# Patient Record
Sex: MALE | Race: WHITE | Employment: FULL TIME | URBAN - METROPOLITAN AREA
[De-identification: names, ages, dates, MRNs, and addresses within clinical notes are randomized per-mention and may not be internally consistent; named-entity substitution may affect disease eponyms.]

---

## 2019-12-31 ENCOUNTER — HOSPITAL ENCOUNTER (EMERGENCY)
Age: 19
Discharge: HOME | End: 2019-12-31
Payer: COMMERCIAL

## 2019-12-31 ENCOUNTER — APPOINTMENT (OUTPATIENT)
Dept: GENERAL RADIOLOGY | Age: 19
End: 2019-12-31
Payer: COMMERCIAL

## 2019-12-31 VITALS
OXYGEN SATURATION: 98 % | DIASTOLIC BLOOD PRESSURE: 75 MMHG | TEMPERATURE: 98.1 F | HEART RATE: 63 BPM | RESPIRATION RATE: 16 BRPM | SYSTOLIC BLOOD PRESSURE: 124 MMHG | WEIGHT: 170 LBS

## 2019-12-31 PROCEDURE — 73562 X-RAY EXAM OF KNEE 3: CPT

## 2019-12-31 PROCEDURE — 99212 OFFICE O/P EST SF 10 MIN: CPT

## 2019-12-31 ASSESSMENT — PAIN DESCRIPTION - ORIENTATION: ORIENTATION: LEFT

## 2019-12-31 ASSESSMENT — PAIN SCALES - GENERAL: PAINLEVEL_OUTOF10: 2

## 2019-12-31 ASSESSMENT — PAIN DESCRIPTION - PAIN TYPE: TYPE: ACUTE PAIN

## 2019-12-31 NOTE — LETTER
[facility]  [address]  [facility] [address]  Phone: [phone]               December 31, 2019    Patient: Erik Dakin   YOB: 2000   Date of Visit: 12/31/2019       To Whom It May Concern:    Luli Mckinnon was seen and treated in our emergency department on 12/31/2019. He may return to work on 1/2/2020.       Sincerely,       Lauri Sanchez LPN         Signature:__________________________________

## 2019-12-31 NOTE — ED PROVIDER NOTES
reviewed. /75   Pulse 63   Temp 98.1 °F (36.7 °C) (Oral)   Resp 16   Wt 170 lb (77.1 kg)   SpO2 98%   Oxygen Saturation Interpretation: Normal      ------------------------------------------ PROGRESS NOTES ------------------------------------------   I have spoken with the patient and discussed todays results, in addition to providing specific details for the plan of care and counseling regarding the diagnosis and prognosis. Their questions are answered at this time and they are agreeable with the plan.      --------------------------------- ADDITIONAL PROVIDER NOTES ---------------------------------     This patient is stable for discharge. I have shared the specific conditions for return, as well as the importance of follow-up. * NOTE: This report was transcribed using voice recognition software. Every effort was made to ensure accuracy; however, inadvertent computerized transcription errors may be present.    --------------------------------- IMPRESSION AND DISPOSITION ---------------------------------    IMPRESSION  1. Sprain of left knee, unspecified ligament, initial encounter    2.  Abrasion, knee, left, initial encounter        DISPOSITION  Disposition: Discharge to home  Patient condition is good         Gilda Baird PA-C  12/31/19 0593

## 2021-04-02 ENCOUNTER — HOSPITAL ENCOUNTER (EMERGENCY)
Age: 21
Discharge: HOME | End: 2021-04-02
Payer: COMMERCIAL

## 2021-04-02 VITALS
HEART RATE: 80 BPM | SYSTOLIC BLOOD PRESSURE: 123 MMHG | TEMPERATURE: 98.6 F | BODY MASS INDEX: 25.2 KG/M2 | HEIGHT: 71 IN | WEIGHT: 180 LBS | OXYGEN SATURATION: 97 % | RESPIRATION RATE: 20 BRPM | DIASTOLIC BLOOD PRESSURE: 78 MMHG

## 2021-04-02 DIAGNOSIS — L03.119 CELLULITIS OF LOWER EXTREMITY, UNSPECIFIED LATERALITY: Primary | ICD-10-CM

## 2021-04-02 DIAGNOSIS — L23.7 POISON IVY: ICD-10-CM

## 2021-04-02 PROCEDURE — 99211 OFF/OP EST MAY X REQ PHY/QHP: CPT

## 2021-04-02 RX ORDER — CEPHALEXIN 500 MG/1
500 CAPSULE ORAL 4 TIMES DAILY
Qty: 28 CAPSULE | Refills: 0 | Status: SHIPPED | OUTPATIENT
Start: 2021-04-02 | End: 2021-04-09

## 2021-04-02 RX ORDER — PREDNISONE 20 MG/1
40 TABLET ORAL DAILY
Qty: 10 TABLET | Refills: 0 | Status: SHIPPED | OUTPATIENT
Start: 2021-04-02 | End: 2021-04-07

## 2021-04-02 NOTE — ED PROVIDER NOTES
Department of Emergency Medicine   [facility]  Provider Note  Admit Date/RoomTime: 2021  7:13 PM  Room:     NAME: Eula Pineda  : 2000  MRN: [id number]     Chief Complaint:  Rash (started  monday with  rash feet leg wrist  mostly right leg)    History of Present Illness       Eula Pineda is a 23 y.o. old male with has a past medical history of: History reviewed. No pertinent past medical history. presents to the urgent care center by private vehicle, for complaint of a rash that he said is poison ivy on his arms and on his legs and feet on the back of his right leg he now has a large red warm swollen patch of skin he said it was itchy there but is not really itchy now but it is tender to touch. This started about 5 days ago. ROS    Pertinent positives and negatives are stated within HPI, all other systems reviewed and are negative. Past Surgical History:   Procedure Laterality Date    TONSILLECTOMY     Social History:  reports that he has never smoked. He has never used smokeless tobacco. He reports that he does not drink alcohol or use drugs. Family History: family history is not on file. Allergies: Patient has no known allergies. Physical Exam           ED Triage Vitals [21 1914]   BP Temp Temp Source Pulse Resp SpO2 Height Weight   123/78 98.6 °F (37 °C) Infrared 80 20 97 % 5' 11\" (1.803 m) 180 lb (81.6 kg)     Oxygen Saturation Interpretation: Normal.    Constitutional:  Alert, development consistent with age. HEENT:  NC/NT. Eyes:  Clear conjunctiva, no discharge. Mouth:  Mucous membranes moist without lesions, tongue and gums normal.    Neck:  Supple. Respiratory:  No respiratory distree  CV:  Regular rate and rhythm. GI:  Abdomen Soft, nontender, +BS. Integument:             There is an area of erythema on her right posterior thigh that is approximately 10 cm x 5 cm's it is warm to touch.   I do not see any rash there at present. He does have a scattered rash on his arms and legs. .  Neurological:  Orientation age-appropriate unless noted elseware. Motor functions intact. Lab / Imaging Results   (All laboratory and radiology results have been personally reviewed by myself)  Labs:  No results found for this visit on 04/02/21. Imaging: All Radiology results interpreted by Radiologist unless otherwise noted. No orders to display       ED Course / Medical Decision Making   Medications - No data to display       Did put him on some prednisone and also some Keflex for the area of cellulitis advised if this worsens or spreads he should get reevaluated otherwise follow-up with his doctor    Assessment      1. Cellulitis of lower extremity, unspecified laterality    2. Poison ivy      Plan   Discharge to home and advised to contact follow up with your Dr      As needed   Patient condition is good    New Medications     New Prescriptions    CEPHALEXIN (KEFLEX) 500 MG CAPSULE    Take 1 capsule by mouth 4 times daily for 7 days    PREDNISONE (DELTASONE) 20 MG TABLET    Take 2 tablets by mouth daily for 5 days     Electronically signed by JULIENNE Finnegan CNP   DD: 4/2/21  **This report was transcribed using voice recognition software. Every effort was made to ensure accuracy; however, inadvertent computerized transcription errors may be present.   END OF ED PROVIDER NOTE       JULIENNE Finnegan CNP  04/02/21 1934

## 2024-04-23 ENCOUNTER — HOSPITAL ENCOUNTER (EMERGENCY)
Age: 24
Discharge: HOME | End: 2024-04-23
Payer: COMMERCIAL

## 2024-04-23 ENCOUNTER — APPOINTMENT (OUTPATIENT)
Dept: GENERAL RADIOLOGY | Age: 24
End: 2024-04-23
Payer: COMMERCIAL

## 2024-04-23 VITALS
OXYGEN SATURATION: 97 % | WEIGHT: 215 LBS | DIASTOLIC BLOOD PRESSURE: 83 MMHG | TEMPERATURE: 98.2 F | RESPIRATION RATE: 18 BRPM | SYSTOLIC BLOOD PRESSURE: 134 MMHG | BODY MASS INDEX: 29.99 KG/M2 | HEART RATE: 70 BPM

## 2024-04-23 DIAGNOSIS — S93.402A SPRAIN OF LEFT ANKLE, UNSPECIFIED LIGAMENT, INITIAL ENCOUNTER: Primary | ICD-10-CM

## 2024-04-23 PROCEDURE — 73610 X-RAY EXAM OF ANKLE: CPT

## 2024-04-23 PROCEDURE — 99211 OFF/OP EST MAY X REQ PHY/QHP: CPT

## 2024-04-23 NOTE — ED PROVIDER NOTES
[facility]  EMERGENCY DEPARTMENT ENCOUNTER        NAME: Galindo Muniz  :  2000  MRN:  [id number]  Date of evaluation: 2024  Provider: Tushar Granados PA-C  PCP: No primary care provider on file.  Note Started : 7:22 PM EDT 24    Chief Complaint: Ankle Pain (Pt stated that he injured his left ankle on Friday. Sore to touch. )      This is a 23-year-old male that presents to urgent care complaining of left ankle pain and swelling x 4 days.  Patient denies any numbness or tingling or weakness.  On first contact patient he appears to be in no acute distress.  No knee or hip pain.        Review of Systems  Pertinent positives and negatives are stated within HPI, all other systems reviewed and are negative.     Allergies: Patient has no known allergies.     --------------------------------------------- PAST HISTORY ---------------------------------------------  Past Medical History:  has no past medical history on file.    Past Surgical History:  has a past surgical history that includes Tonsillectomy.    Social History:  reports that he has never smoked. He has never used smokeless tobacco. He reports that he does not drink alcohol and does not use drugs.    Family History: family history is not on file.     The patient’s home medications have been reviewed.    The nursing notes within the ED encounter have been reviewed.     ------------------------------------------------SCREENINGS----------------------------------------------                        CIWA Assessment  BP: 134/83  Pulse: 70           ---------------------------------------------PHYSICAL EXAM --------------------------------------------    Vitals:    24   BP:  134/83   Pulse:  70   Resp: 18 18   Temp: 98.2 °F (36.8 °C)    SpO2:  97%   Weight: 97.5 kg (215 lb)      Oxygen Saturation Interpretation: Normal     Physical Exam  Vitals and nursing note reviewed.   Constitutional:

## 2024-06-10 ENCOUNTER — HOSPITAL ENCOUNTER (EMERGENCY)
Age: 24
Discharge: HOME | End: 2024-06-10
Payer: COMMERCIAL

## 2024-06-10 ENCOUNTER — APPOINTMENT (OUTPATIENT)
Dept: GENERAL RADIOLOGY | Age: 24
End: 2024-06-10
Payer: COMMERCIAL

## 2024-06-10 VITALS
OXYGEN SATURATION: 96 % | TEMPERATURE: 98.2 F | HEART RATE: 68 BPM | WEIGHT: 197 LBS | HEIGHT: 70 IN | SYSTOLIC BLOOD PRESSURE: 117 MMHG | DIASTOLIC BLOOD PRESSURE: 70 MMHG | BODY MASS INDEX: 28.2 KG/M2 | RESPIRATION RATE: 18 BRPM

## 2024-06-10 DIAGNOSIS — S60.221A CONTUSION OF RIGHT HAND, INITIAL ENCOUNTER: Primary | ICD-10-CM

## 2024-06-10 PROCEDURE — 73130 X-RAY EXAM OF HAND: CPT

## 2024-06-10 PROCEDURE — 99211 OFF/OP EST MAY X REQ PHY/QHP: CPT

## 2024-06-10 RX ORDER — IBUPROFEN 800 MG/1
800 TABLET ORAL EVERY 8 HOURS PRN
Qty: 30 TABLET | Refills: 0 | Status: SHIPPED | OUTPATIENT
Start: 2024-06-10

## 2024-06-10 ASSESSMENT — PAIN DESCRIPTION - ORIENTATION: ORIENTATION: RIGHT

## 2024-06-10 ASSESSMENT — PAIN SCALES - GENERAL: PAINLEVEL_OUTOF10: 3

## 2024-06-10 ASSESSMENT — PAIN DESCRIPTION - LOCATION: LOCATION: HAND

## 2024-06-10 ASSESSMENT — PAIN - FUNCTIONAL ASSESSMENT: PAIN_FUNCTIONAL_ASSESSMENT: 0-10

## 2024-06-10 ASSESSMENT — PAIN DESCRIPTION - PAIN TYPE: TYPE: ACUTE PAIN

## 2024-06-10 ASSESSMENT — PAIN DESCRIPTION - DESCRIPTORS: DESCRIPTORS: ACHING

## 2024-06-10 ASSESSMENT — PAIN DESCRIPTION - FREQUENCY: FREQUENCY: CONTINUOUS

## 2024-06-10 NOTE — ED PROVIDER NOTES
requiring emergent reevaluation in the ER.     Plan of Care/Counseling:  JULIENNE Sandy CNP reviewed today's visit with the patient in addition to providing specific details for the plan of care and counseling regarding the diagnosis and prognosis.  Questions are answered at this time and are agreeable with the plan.    Assessment      1. Contusion of right hand, initial encounter      Plan   Discharged home.  Patient condition is stable    Andre Nogueira MD  [address] [facility] [address]  [phone]    Call   If symptoms worsen       New Medications     New Prescriptions    IBUPROFEN (ADVIL;MOTRIN) 800 MG TABLET    Take 1 tablet by mouth every 8 hours as needed for Pain     Electronically signed by JULIENNE Sandy CNP   DD: 6/10/24  **This report was transcribed using voice recognition software. Every effort was made to ensure accuracy; however, inadvertent computerized transcription errors may be present.  END OF ED PROVIDER NOTE      Ester Hogan APRN - CNP  06/10/24 1424

## 2024-06-10 NOTE — DISCHARGE INSTRUCTIONS
No fractures on your x-ray.  Continue to rest, ice, elevate your hand, Ace wrap while awake, may remove at rest and for showering.  Please follow-up with orthopedics if symptoms do not improve with conservative treatment over the next 10 to 14 days.

## 2024-12-15 ENCOUNTER — HOSPITAL ENCOUNTER (EMERGENCY)
Age: 24
Discharge: HOME | End: 2024-12-15
Payer: COMMERCIAL

## 2024-12-15 ENCOUNTER — APPOINTMENT (OUTPATIENT)
Dept: GENERAL RADIOLOGY | Age: 24
End: 2024-12-15
Payer: COMMERCIAL

## 2024-12-15 VITALS
OXYGEN SATURATION: 100 % | TEMPERATURE: 99.9 F | SYSTOLIC BLOOD PRESSURE: 151 MMHG | WEIGHT: 230 LBS | RESPIRATION RATE: 18 BRPM | HEART RATE: 98 BPM | BODY MASS INDEX: 33 KG/M2 | DIASTOLIC BLOOD PRESSURE: 90 MMHG

## 2024-12-15 DIAGNOSIS — M25.561 ACUTE PAIN OF RIGHT KNEE: Primary | ICD-10-CM

## 2024-12-15 PROCEDURE — 73560 X-RAY EXAM OF KNEE 1 OR 2: CPT

## 2024-12-15 PROCEDURE — 99211 OFF/OP EST MAY X REQ PHY/QHP: CPT

## 2024-12-15 RX ORDER — NAPROXEN 500 MG/1
500 TABLET ORAL EVERY 12 HOURS PRN
Qty: 14 TABLET | Refills: 0 | Status: SHIPPED | OUTPATIENT
Start: 2024-12-15 | End: 2024-12-22

## 2024-12-15 ASSESSMENT — PAIN DESCRIPTION - DESCRIPTORS: DESCRIPTORS: STABBING

## 2024-12-15 ASSESSMENT — PAIN - FUNCTIONAL ASSESSMENT: PAIN_FUNCTIONAL_ASSESSMENT: 0-10

## 2024-12-15 ASSESSMENT — PAIN SCALES - GENERAL: PAINLEVEL_OUTOF10: 3

## 2024-12-15 ASSESSMENT — PAIN DESCRIPTION - ORIENTATION: ORIENTATION: RIGHT

## 2024-12-15 ASSESSMENT — PAIN DESCRIPTION - LOCATION: LOCATION: KNEE

## 2024-12-16 NOTE — DISCHARGE INSTR - COC
Continuity of Care Form    Patient Name: Galindo Muniz   :  2000  MRN:  [id number]    Admit date:  12/15/2024  Discharge date:  ***    Code Status Order: No Order   Advance Directives:   Advance Care Flowsheet Documentation             Admitting Physician:  No admitting provider for patient encounter.  PCP: No primary care provider on file.    Discharging Nurse: ***  Discharging Hospital Unit/Room#:   Discharging Unit Phone Number: ***    Emergency Contact:   Extended Emergency Contact Information  Primary Emergency Contact: Momo Muniz  Address: [address] [place] [address]            [address]  Home Phone: [phone]  Mobile Phone: [phone]  Relation: Parent    Past Surgical History:  Past Surgical History:   Procedure Laterality Date    TONSILLECTOMY         Immunization History:   Immunization History   Administered Date(s) Administered    TDaP, ADACEL (age 10y-64y), BOOSTRIX (age 10y+), IM, 0.5mL 2015       Active Problems:  There is no problem list on file for this patient.      Isolation/Infection:   Isolation            No Isolation          Patient Infection Status       None to display            Nurse Assessment:  Last Vital Signs: BP (!) 151/90   Pulse 98   Temp 99.9 °F (37.7 °C)   Resp 18   Wt 104.3 kg (230 lb)   SpO2 100%   BMI 33.00 kg/m²     Last documented pain score (0-10 scale): Pain Level: 3  Last Weight:   Wt Readings from Last 1 Encounters:   12/15/24 104.3 kg (230 lb)     Mental Status:  {IP PT MENTAL STATUS:65421}    IV Access:  { GEOVANI IV ACCESS:742606038}    Nursing Mobility/ADLs:  Walking   {CHP DME ADLs:652323456}  Transfer  {CHP DME ADLs:573930108}  Bathing  {CHP DME ADLs:974841844}  Dressing  {CHP DME ADLs:125011151}  Toileting  {CHP DME ADLs:435482169}  Feeding  {CHP DME ADLs:388155552}  Med Admin  {CHP DME ADLs:138449703}  Med Delivery   { GEOVANI MED Delivery:196940028}    Wound Care Documentation and Therapy:

## 2025-01-14 ENCOUNTER — HOSPITAL ENCOUNTER (EMERGENCY)
Age: 25
Discharge: HOME | End: 2025-01-14
Payer: COMMERCIAL

## 2025-01-14 VITALS
WEIGHT: 230 LBS | RESPIRATION RATE: 18 BRPM | DIASTOLIC BLOOD PRESSURE: 80 MMHG | HEART RATE: 97 BPM | BODY MASS INDEX: 33 KG/M2 | TEMPERATURE: 98.6 F | SYSTOLIC BLOOD PRESSURE: 146 MMHG | OXYGEN SATURATION: 99 %

## 2025-01-14 DIAGNOSIS — J10.1 INFLUENZA A: Primary | ICD-10-CM

## 2025-01-14 LAB
INFLUENZA A BY PCR: DETECTED
INFLUENZA B BY PCR: NOT DETECTED

## 2025-01-14 PROCEDURE — 87502 INFLUENZA DNA AMP PROBE: CPT

## 2025-01-14 PROCEDURE — 99211 OFF/OP EST MAY X REQ PHY/QHP: CPT

## 2025-01-14 ASSESSMENT — PAIN - FUNCTIONAL ASSESSMENT: PAIN_FUNCTIONAL_ASSESSMENT: 0-10

## 2025-01-14 ASSESSMENT — PAIN SCALES - GENERAL: PAINLEVEL_OUTOF10: 0

## 2025-01-14 NOTE — ED PROVIDER NOTES
Independent [facility] Visit.    HPI:  1/14/25,   Time: 3:00 PM SATNAM Muniz is a 24 y.o. male presenting to the ED for cough, shortness of breath, body aches and fatigue.  Started 3 days ago.  Cough is sometimes productive with yellow mucus.  Patient reports being around sick contacts with similar symptoms the week before.  Has been taking DayQuil and NyQuil.  Some relief.  Also feeling short of breath when he coughs.  No history of asthma or lung disease.  Patient did take a COVID test at home and it was negative.  Denies fever, headache, dizziness, sore throat, facial swelling, chest pain, hemoptysis, abdominal pain, nausea, vomiting or diarrhea.    ROS:   Pertinent positives and negatives are stated within HPI, all other systems reviewed and are negative.  --------------------------------------------- PAST HISTORY ---------------------------------------------  Past Medical History:  has no past medical history on file.    Past Surgical History:  has a past surgical history that includes Tonsillectomy.    Social History:  reports that he has never smoked. He has never used smokeless tobacco. He reports that he does not drink alcohol and does not use drugs.    Family History: family history is not on file.     The patient’s home medications have been reviewed.    Allergies: Patient has no known allergies.    -------------------------------------------------- RESULTS -------------------------------------------------  All laboratory and radiology results have been personally reviewed by myself   LABS:  Results for orders placed or performed during the hospital encounter of 01/14/25   Influenza A+B, PCR    Specimen: Nasal   Result Value Ref Range    Influenza A by PCR DETECTED (A) Not Detected    Influenza B by PCR Not Detected Not Detected       RADIOLOGY:  Interpreted by Radiologist.  No orders to display       ------------------------- NURSING NOTES AND VITALS REVIEWED ---------------------------   The